# Patient Record
Sex: FEMALE | Race: ASIAN | NOT HISPANIC OR LATINO | Employment: UNEMPLOYED | URBAN - METROPOLITAN AREA
[De-identification: names, ages, dates, MRNs, and addresses within clinical notes are randomized per-mention and may not be internally consistent; named-entity substitution may affect disease eponyms.]

---
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
## 2025-01-08 ENCOUNTER — OFFICE VISIT (OUTPATIENT)
Dept: FAMILY MEDICINE | Facility: CLINIC | Age: 20
End: 2025-01-08
Payer: COMMERCIAL

## 2025-01-08 VITALS
RESPIRATION RATE: 18 BRPM | TEMPERATURE: 97.6 F | HEIGHT: 65 IN | DIASTOLIC BLOOD PRESSURE: 70 MMHG | OXYGEN SATURATION: 98 % | BODY MASS INDEX: 39.82 KG/M2 | SYSTOLIC BLOOD PRESSURE: 104 MMHG | HEART RATE: 70 BPM | WEIGHT: 239 LBS

## 2025-01-08 DIAGNOSIS — Z30.46 ENCOUNTER FOR NEXPLANON REMOVAL: Primary | ICD-10-CM

## 2025-01-08 PROCEDURE — 58301 REMOVE INTRAUTERINE DEVICE: CPT | Mod: 53 | Performed by: MASSAGE THERAPIST

## 2025-01-08 NOTE — PROGRESS NOTES
"  {PROVIDER CHARTING PREFERENCE:480002}    Stephanie Vale is a 19 year old, presenting for the following health issues:  nexplanon removal (Removal only)      1/8/2025    11:15 AM   Additional Questions   Roomed by Dodie   Accompanied by self     Via the Health Maintenance questionnaire, the patient has reported the following services have been completed -Chlamydia: [employer] 2024-12-16, this information has been sent to the abstraction team.  HPI     {MA/LPN/RN Pre-Provider Visit Orders- hCG/UA/Strep (Optional):668309}  {SUPERLIST (Optional):607333}  {additonal problems for provider to add (Optional):344564}    {ROS Picklists (Optional):716937}      Objective    /70 (BP Location: Left arm, Patient Position: Sitting, Cuff Size: Adult Regular)   Pulse 70   Temp 97.6  F (36.4  C) (Temporal)   Resp 18   Ht 1.65 m (5' 4.96\")   Wt 108.4 kg (239 lb)   LMP 12/09/2024   SpO2 98%   BMI 39.82 kg/m    Body mass index is 39.82 kg/m .  Physical Exam   {Exam List (Optional):023440}    {Diagnostic Test Results (Optional):958488}        Signed Electronically by: Neymar Harding MD  Prior to immunization administration, verified patients identity using patient s name and date of birth. Please see Immunization Activity for additional information.     Screening Questionnaire for Pediatric Immunization    Is the child sick today?   No   Does the child have allergies to medications, food, a vaccine component, or latex?   No   Has the child had a serious reaction to a vaccine in the past?   No   Does the child have a long-term health problem with lung, heart, kidney or metabolic disease (e.g., diabetes), asthma, a blood disorder, no spleen, complement component deficiency, a cochlear implant, or a spinal fluid leak?  Is he/she on long-term aspirin therapy?   No   If the child to be vaccinated is 2 through 4 years of age, has a healthcare provider told you that the child had wheezing or asthma in the  past " 12 months?   No   If your child is a baby, have you ever been told he or she has had intussusception?   No   Has the child, sibling or parent had a seizure, has the child had brain or other nervous system problems?   No   Does the child have cancer, leukemia, AIDS, or any immune system         problem?   No   Does the child have a parent, brother, or sister with an immune system problem?   No   In the past 3 months, has the child taken medications that affect the immune system such as prednisone, other steroids, or anticancer drugs; drugs for the treatment of rheumatoid arthritis, Crohn s disease, or psoriasis; or had radiation treatments?   No   In the past year, has the child received a transfusion of blood or blood products, or been given immune (gamma) globulin or an antiviral drug?   No   Is the child/teen pregnant or is there a chance that she could become       pregnant during the next month?   No   Has the child received any vaccinations in the past 4 weeks?   No               Immunization questionnaire answers were all negative.      Patient instructed to remain in clinic for 15 minutes afterwards, and to report any adverse reactions.     Screening performed by Dodie Roland MA on 1/8/2025 at 11:19 AM.   {Email feedback regarding this note to [email or web address]   :[id number]}

## 2025-01-08 NOTE — PROGRESS NOTES
Nexplanon Removal:     Tried to have removed at [facility]- could not remove      Is a pregnancy test required: No.  Was a consent obtained?  Yes    Akanksha Alfred is here for removal of etonogestrel implant Nexplanon/Implanon    Indication: Desired removal of nexplanon      Preoperative Diagnosis: etonogestrel implant  Postoperative Diagnosis: etonogestrel implant- unable to be removed    Technique: On the left arm  Skin prep Betadine  Anesthesia 1% lidocaine, with epi  Procedure: Small incision (<5mm) was made at distal end of palpable implant, curved hemostat or mosquito forceps was used to attempt to isolate the implant. Distal tip of implant unable to be visualized through opening. Dressing placed.       EBL: minimal  Complications:  Yes  Tolerance:  Pt tolerated procedure well and was in stable condition.   Dressing:    A pressure bandage was placed for the next 12-24 hours.        Follow up: Pt was instructed to call if bleeding, severe pain or foul smell. Patient will be referred to general surgery for removal under ultrasound guidance.     Neymar Harding MD

## 2025-01-15 ENCOUNTER — TRANSFERRED RECORDS (OUTPATIENT)
Dept: HEALTH INFORMATION MANAGEMENT | Facility: CLINIC | Age: 20
End: 2025-01-15
Payer: COMMERCIAL

## 2025-01-21 NOTE — PROGRESS NOTES
GENERAL SURGICAL CONSULTATION    I was requested by Amanda Ch to consult on this pt to evaluate them for a Nexplanon removal    HPI:  This is a 19 year old female here today with a Nexplanon implant in her left arm.  There is been 2 attempts to remove the implanted been unsuccessful she came in my clinic to be evaluated.  I can palpate the implant I think it is worth attempting removal in our clinic.    Allergies:Patient has no known allergies.    No past medical history on file.    Past Surgical History:   Procedure Laterality Date    NO PAST SURGERIES         CURRENT MEDS:  Current Outpatient Medications   Medication Sig Dispense Refill    cetirizine (ZYRTEC) 10 MG tablet Take 1 tablet (10 mg) by mouth daily. 90 tablet 0    clindamycin (CLEOCIN T) 1 % external solution [CLINDAMYCIN (CLEOCIN T) 1 % EXTERNAL SOLUTION] Apply to affected area 2 times daily (Patient not taking: Reported on 1/8/2025) 60 mL 0    tretinoin (RETIN-A) 0.05 % external cream Apply topically at bedtime (Patient not taking: Reported on 1/8/2025) 45 g 1       Family History   Problem Relation Age of Onset    Obesity Mother     Coronary Artery Disease Father 45    Diabetes Father     Hyperlipidemia Father     Other - See Comments Sister         Delayed Developmental Milestones Speech    No Known Problems Sister     No Known Problems Sister     No Known Problems Sister     No Known Problems Sister     Dyslipidemia Sister         Dx: 11 yo    Dyslipidemia Sister         Dx: 10 yo    No Known Problems Sister     No Known Problems Sister     No Known Problems Brother     No Known Problems Brother     No Known Problems Brother     No Known Problems Brother     Hypertension Maternal Grandmother     Diabetes Paternal Grandmother     Hyperlipidemia Paternal Grandmother     Diabetes Paternal Grandfather      Family history is not pertinent to this patients Chief Complaint.     reports that she has been smoking vaping device. She has never been  "exposed to tobacco smoke. She has never used smokeless tobacco. She reports current alcohol use. She reports that she does not use drugs.    Review of Systems -   10 point Review of systems is negative except for; as mentioned above in HPI and PMHx    /82   Ht 1.65 m (5' 4.96\")   Wt 107.1 kg (236 lb 1.6 oz)   LMP 12/09/2024   BMI 39.34 kg/m       EXAM:  GENERAL: Well developed female  HEENT: EOMI, Anicteric Sclera, Moist Mucous Membranes,  In Mouth the pt does not have redness or bleeding gums  CARDIOVASCULAR: RRR w/out murmur   CHEST/LUNG: Clear to Auscultation  ABDOMEN:  Non tender to palpation, +BS  MUSCULOSKELETAL:  No deformities with good range of motion in all extremities  NEURO: She is ambulatory with good strength in both legs.  HEME/LYMPH: No Cervical Adenopathy or tenderness.     IMAGES:  None    Assessment/Plan:  19-year-old patient with Nexplanon that she would like to have removed.  This to be attempted in the office.    PROCEDURE NOTE:  Betadine prep  Infused with 1% lidocaine with epinephrine as a field block.  1 cm incision is made with 15 blade scalpel.  Blunt dissection was performed with a mosquito until I identified the implant.  The implant was grasped with the mosquito and brought up towards the incision.  The capsule around the implant was dissected through with sharp dissection of Metzenbaum scissors.  The implant was freed up and I was able to extract as a whole implant device.  The wound was drawn together with 2 interrupted 4-0 subcuticular Monocryl sutures.  The wound was dressed with a large Band-Aid.      Leobardo Castaneda MD  [facility]  [phone]    "

## 2025-01-22 ENCOUNTER — OFFICE VISIT (OUTPATIENT)
Dept: SURGERY | Facility: CLINIC | Age: 20
End: 2025-01-22
Attending: MASSAGE THERAPIST
Payer: COMMERCIAL

## 2025-01-22 VITALS
WEIGHT: 236.1 LBS | DIASTOLIC BLOOD PRESSURE: 82 MMHG | BODY MASS INDEX: 39.34 KG/M2 | SYSTOLIC BLOOD PRESSURE: 110 MMHG | HEIGHT: 65 IN

## 2025-01-22 DIAGNOSIS — Z30.46 ENCOUNTER FOR NEXPLANON REMOVAL: ICD-10-CM

## 2025-01-22 PROCEDURE — 11982 REMOVE DRUG IMPLANT DEVICE: CPT | Performed by: SURGERY

## 2025-01-22 RX ORDER — SPIRONOLACTONE 50 MG/1
1 TABLET, FILM COATED ORAL
COMMUNITY
Start: 2025-01-15

## 2025-01-22 RX ORDER — TRETINOIN 0.025 %
CREAM (GRAM) TOPICAL
COMMUNITY
Start: 2025-01-15

## 2025-01-22 NOTE — LETTER
1/22/2025      Akanksha Alfred  [address]  [place] [address]      Dear Colleague,    Thank you for referring your patient, Akanksha Alfred, to the [facility] [place]. Please see a copy of my visit note below.    GENERAL SURGICAL CONSULTATION    I was requested by Amanda Ch to consult on this pt to evaluate them for a Nexplanon removal    HPI:  This is a 19 year old female here today with a Nexplanon implant in her left arm.  There is been 2 attempts to remove the implanted been unsuccessful she came in my clinic to be evaluated.  I can palpate the implant I think it is worth attempting removal in our clinic.    Allergies:Patient has no known allergies.    No past medical history on file.    Past Surgical History:   Procedure Laterality Date     NO PAST SURGERIES         CURRENT MEDS:  Current Outpatient Medications   Medication Sig Dispense Refill     cetirizine (ZYRTEC) 10 MG tablet Take 1 tablet (10 mg) by mouth daily. 90 tablet 0     clindamycin (CLEOCIN T) 1 % external solution [CLINDAMYCIN (CLEOCIN T) 1 % EXTERNAL SOLUTION] Apply to affected area 2 times daily (Patient not taking: Reported on 1/8/2025) 60 mL 0     tretinoin (RETIN-A) 0.05 % external cream Apply topically at bedtime (Patient not taking: Reported on 1/8/2025) 45 g 1       Family History   Problem Relation Age of Onset     Obesity Mother      Coronary Artery Disease Father 45     Diabetes Father      Hyperlipidemia Father      Other - See Comments Sister         Delayed Developmental Milestones Speech     No Known Problems Sister      No Known Problems Sister      No Known Problems Sister      No Known Problems Sister      Dyslipidemia Sister         Dx: 11 yo     Dyslipidemia Sister         Dx: 10 yo     No Known Problems Sister      No Known Problems Sister      No Known Problems Brother      No Known Problems Brother      No Known Problems Brother      No Known Problems  "Brother      Hypertension Maternal Grandmother      Diabetes Paternal Grandmother      Hyperlipidemia Paternal Grandmother      Diabetes Paternal Grandfather      Family history is not pertinent to this patients Chief Complaint.     reports that she has been smoking vaping device. She has never been exposed to tobacco smoke. She has never used smokeless tobacco. She reports current alcohol use. She reports that she does not use drugs.    Review of Systems -   10 point Review of systems is negative except for; as mentioned above in HPI and PMHx    /82   Ht 1.65 m (5' 4.96\")   Wt 107.1 kg (236 lb 1.6 oz)   LMP 12/09/2024   BMI 39.34 kg/m       EXAM:  GENERAL: Well developed female  HEENT: EOMI, Anicteric Sclera, Moist Mucous Membranes,  In Mouth the pt does not have redness or bleeding gums  CARDIOVASCULAR: RRR w/out murmur   CHEST/LUNG: Clear to Auscultation  ABDOMEN:  Non tender to palpation, +BS  MUSCULOSKELETAL:  No deformities with good range of motion in all extremities  NEURO: She is ambulatory with good strength in both legs.  HEME/LYMPH: No Cervical Adenopathy or tenderness.     IMAGES:  None    Assessment/Plan:  19-year-old patient with Nexplanon that she would like to have removed.  This to be attempted in the office.    PROCEDURE NOTE:  Betadine prep  Infused with 1% lidocaine with epinephrine as a field block.  1 cm incision is made with 15 blade scalpel.  Blunt dissection was performed with a mosquito until I identified the implant.  The implant was grasped with the mosquito and brought up towards the incision.  The capsule around the implant was dissected through with sharp dissection of Metzenbaum scissors.  The implant was freed up and I was able to extract as a whole implant device.  The wound was drawn together with 2 interrupted 4-0 subcuticular Monocryl sutures.  The wound was dressed with a large Band-Aid.      Leobardo Castaneda MD  [facility]  [phone]      Again, thank you " for allowing me to participate in the care of your patient.        Sincerely,        Leobardo Castaneda MD    Electronically signed

## 2025-02-05 ENCOUNTER — HOSPITAL ENCOUNTER (OUTPATIENT)
Dept: NUTRITION | Facility: CLINIC | Age: 20
Discharge: HOME | End: 2025-02-05
Attending: FAMILY MEDICINE
Payer: COMMERCIAL

## 2025-02-05 DIAGNOSIS — E78.5 DYSLIPIDEMIA: ICD-10-CM

## 2025-02-05 PROCEDURE — 97802 MEDICAL NUTRITION INDIV IN: CPT | Mod: GT,95 | Performed by: DIETITIAN, REGISTERED

## 2025-02-05 NOTE — PROGRESS NOTES
"Virtual Visit Details    Type of service: Telephone   Video Start Time:  1:00 PM   Video End Time: 1:31 PM     Originating Location (pt. Location): Home  Distant Location (provider location):  On-site  Platform used for Video Visit: Telephone as patient states camera was broken on phone     OUTPATIENT NUTRITION ASSESSMENT   REASON FOR ASSESSMENT  Akanksha Alfred referred by Dr. Ch for MNT related to Dyslipidemia [E78.5]    Patient accompanied by self      ASSESSMENT   Nutrition History:  - Information obtained from pt  Patient is on regular diet at home. She typically had 2 meals/day, first meal starting at noon. She states eats large portion size of fruits after lunch and/or dinner(Asian pear, orange, apples, berries), vegetables 2-3 times per day, some sweet asian snack or candies through the day     Diet Recall:  Breakfast: none   Lunch: soup or 1:00-3:00 (kimchi + pork or beef + tofu + fish sauce)    Dinner: oil with protein (pork) + greens Asian bok jacqui or napa cabbage + rice (5:00-6:00)   Snack: 2 boiled egg -daily (2:00-4:00)   Beverages: tea -lemon,. Pear, cinnamon , paprika and water   Dining out: 2-3 times per week Korean restaurant rice, chicken          Exercise: walking; 3-4 times per week cardio; occasional weights     NUTRITION FOCUSED PHYSICAL ASSESSMENT (NFPA) FOR DIAGNOSING MALNUTRITION  No          Obtained from Chart/Interdisciplinary Team:  None noted     LABS  Labs reviewed - 12/10  Cholesterol: 205 (H)  HDL: 35 (L)  LDL: 137 (H)  T (H)    MEDICATIONS  Medications reviewed    ANTHROPOMETRICS   Height: 5'4.96\"  Weight: 107.1kg  BMI (kg/m2): 39.3 kg/m2   Weight Status:  Obesity Grade II BMI 35-39.9  %IBW: 188%  Weight History:   Wt Readings from Last 5 Encounters:   25 107.1 kg (236 lb 1.6 oz) (>99%, Z= 2.39)*   25 108.4 kg (239 lb) (>99%, Z= 2.41)*   12/10/24 106.6 kg (235 lb) (>99%, Z= 2.37)*   23 106.1 kg (234 lb) (>99%, Z= 2.34)*   23 103.9 kg (229 lb) (99%, " Z= 2.29)*     * Growth percentiles are based on CDC (Girls, 2-20 Years) data.        ASSESSED NUTRITION NEEDS  Estimated Energy Needs: 1500 - 1820 kcals/day (14-17 Kcal/Kg)  Justification:  (obese)  Estimated Protein Needs: 71-86 grams protein/day (1-1.2 g pro/ adj Kg)  Justification:  (obesity guidelines and preservation of lean body mass)  Estimated Fluid Needs: 1500 - 1820 mL/day (1 mL/Kcal)    ASSESSED MALNUTRITION STATUS  % Weight Loss:  None noted  % Intake:  No decreased intake noted  Subcutaneous Fat Loss:  unable to assess this time  Loss of Muscle Mass:  unable to assess this time  Fluid Retention:  None noted    Malnutrition Diagnosis:  Patient does not meet two of the above criteria necessary for diagnosing malnutrition    DIAGNOSIS   Nutrition Diagnosis:  Obesity related to excess energy intake as evidenced by BMI of 39.3 kg/m2     INTERVENTIONS   Recommend normalized eating pattern      IMPLEMENTATION   Assessed learning needs and learning preference:   Teaching Method(s) used: Explanation  Diet Education:  Provided education on heart healthy diet   Nutrition Education (Content):   a)  Discussed current diet intake. Reviewed lipid panel with patient. Encouraged focus on intentional exercise to improve HDL. Discussed heart healthy diet guidelines. Suggest patient reduce red meat consumption to 1 time per week. Recommend 2-3 T olive oil and 1 oz nuts (walnuts, almonds and hazelnuts) daily. Also encouraged 25-30 grams fiber per day. Instructed patient on label reading using label from home. Encouraged gradual diet and behavior change for long term healthy relationship with food. Supported patient with the challenge of diet changes.    b)  Provided [employer] General Mediterranean Nutrition Therapy handout    Nutrition Education (Application):   a)  Discussed current eating plans and offered suggestions for food options    b)  Patient verbalizes understanding of diet by stating will  consume fiber from food 25-30g and eat nuts daily   Expected patient engagement: fair     GOALS-(per patient determined)  Eat fiber 25-30 g daily  Eat nuts -daily     FOLLOW UP/MONITORING   Progress towards goals will be monitored and evaluated per protocol and Practice Guidelines  Patient to call for follow up appointment or if has further questions  RD name and number provided     Time Spent with Patient  31 minutes     Alfreda Yap, RD, LD  [facility] Outpatient Dietitian  [phone] (office phone)